# Patient Record
Sex: FEMALE | Race: OTHER | Employment: PART TIME | URBAN - METROPOLITAN AREA
[De-identification: names, ages, dates, MRNs, and addresses within clinical notes are randomized per-mention and may not be internally consistent; named-entity substitution may affect disease eponyms.]

---

## 2018-11-14 ENCOUNTER — HOSPITAL ENCOUNTER (OUTPATIENT)
Age: 23
Setting detail: SPECIMEN
Discharge: HOME | End: 2018-11-14
Payer: MEDICARE

## 2018-11-14 ENCOUNTER — OFFICE VISIT (OUTPATIENT)
Dept: INTERNAL MEDICINE | Age: 23
End: 2018-11-14
Payer: MEDICARE

## 2018-11-14 VITALS
BODY MASS INDEX: 22.88 KG/M2 | HEART RATE: 52 BPM | OXYGEN SATURATION: 98 % | WEIGHT: 134 LBS | HEIGHT: 64 IN | DIASTOLIC BLOOD PRESSURE: 72 MMHG | SYSTOLIC BLOOD PRESSURE: 126 MMHG

## 2018-11-14 DIAGNOSIS — Z23 NEEDS FLU SHOT: ICD-10-CM

## 2018-11-14 DIAGNOSIS — R19.8 ALTERNATING CONSTIPATION AND DIARRHEA: ICD-10-CM

## 2018-11-14 DIAGNOSIS — R10.13 EPIGASTRIC PAIN: ICD-10-CM

## 2018-11-14 DIAGNOSIS — R10.13 EPIGASTRIC PAIN: Primary | ICD-10-CM

## 2018-11-14 DIAGNOSIS — Z23 NEED FOR TDAP VACCINATION: ICD-10-CM

## 2018-11-14 LAB
ABSOLUTE EOS #: 0.12 K/UL (ref 0–0.44)
ABSOLUTE IMMATURE GRANULOCYTE: 0.03 K/UL (ref 0–0.3)
ABSOLUTE LYMPH #: 1.32 K/UL (ref 1.1–3.7)
ABSOLUTE MONO #: 0.53 K/UL (ref 0.1–1.2)
BASOPHILS # BLD: 1 % (ref 0–2)
BASOPHILS ABSOLUTE: 0.04 K/UL (ref 0–0.2)
C-REACTIVE PROTEIN: 8.7 MG/L (ref 0–5)
DIFFERENTIAL TYPE: ABNORMAL
EOSINOPHILS RELATIVE PERCENT: 2 % (ref 1–4)
HCT VFR BLD CALC: 44.1 % (ref 36.3–47.1)
HEMOGLOBIN: 14.3 G/DL (ref 11.9–15.1)
IMMATURE GRANULOCYTES: 0 %
LYMPHOCYTES # BLD: 17 % (ref 24–43)
MCH RBC QN AUTO: 30.6 PG (ref 25.2–33.5)
MCHC RBC AUTO-ENTMCNC: 32.4 G/DL (ref 28.4–34.8)
MCV RBC AUTO: 94.4 FL (ref 82.6–102.9)
MONOCYTES # BLD: 7 % (ref 3–12)
NRBC AUTOMATED: 0 PER 100 WBC
PDW BLD-RTO: 12.2 % (ref 11.8–14.4)
PLATELET # BLD: 281 K/UL (ref 138–453)
PLATELET ESTIMATE: ABNORMAL
PMV BLD AUTO: 11.6 FL (ref 8.1–13.5)
RBC # BLD: 4.67 M/UL (ref 3.95–5.11)
RBC # BLD: ABNORMAL 10*6/UL
SEDIMENTATION RATE, ERYTHROCYTE: 5 MM (ref 0–20)
SEG NEUTROPHILS: 73 % (ref 36–65)
SEGMENTED NEUTROPHILS ABSOLUTE COUNT: 5.72 K/UL (ref 1.5–8.1)
WBC # BLD: 7.8 K/UL (ref 3.5–11.3)
WBC # BLD: ABNORMAL 10*3/UL

## 2018-11-14 PROCEDURE — 85025 COMPLETE CBC W/AUTO DIFF WBC: CPT

## 2018-11-14 PROCEDURE — G8420 CALC BMI NORM PARAMETERS: HCPCS | Performed by: INTERNAL MEDICINE

## 2018-11-14 PROCEDURE — G8427 DOCREV CUR MEDS BY ELIG CLIN: HCPCS | Performed by: INTERNAL MEDICINE

## 2018-11-14 PROCEDURE — 86140 C-REACTIVE PROTEIN: CPT

## 2018-11-14 PROCEDURE — 85651 RBC SED RATE NONAUTOMATED: CPT

## 2018-11-14 PROCEDURE — 99211 OFF/OP EST MAY X REQ PHY/QHP: CPT | Performed by: INTERNAL MEDICINE

## 2018-11-14 PROCEDURE — 90715 TDAP VACCINE 7 YRS/> IM: CPT | Performed by: INTERNAL MEDICINE

## 2018-11-14 PROCEDURE — 36415 COLL VENOUS BLD VENIPUNCTURE: CPT

## 2018-11-14 PROCEDURE — 90686 IIV4 VACC NO PRSV 0.5 ML IM: CPT | Performed by: INTERNAL MEDICINE

## 2018-11-14 PROCEDURE — 99203 OFFICE O/P NEW LOW 30 MIN: CPT | Performed by: INTERNAL MEDICINE

## 2018-11-14 PROCEDURE — G8482 FLU IMMUNIZE ORDER/ADMIN: HCPCS | Performed by: INTERNAL MEDICINE

## 2018-11-14 PROCEDURE — 1036F TOBACCO NON-USER: CPT | Performed by: INTERNAL MEDICINE

## 2018-11-14 RX ORDER — DESOGESTREL AND ETHINYL ESTRADIOL 0.15-0.03
1 KIT ORAL DAILY
COMMUNITY
Start: 2018-10-18

## 2018-11-14 ASSESSMENT — ENCOUNTER SYMPTOMS
SHORTNESS OF BREATH: 0
NAUSEA: 0
VOMITING: 1
ABDOMINAL PAIN: 1
BLOOD IN STOOL: 0
WHEEZING: 0
CONSTIPATION: 1
DIARRHEA: 1
VOICE CHANGE: 0
COUGH: 1
RHINORRHEA: 0

## 2018-11-14 ASSESSMENT — PATIENT HEALTH QUESTIONNAIRE - PHQ9
SUM OF ALL RESPONSES TO PHQ QUESTIONS 1-9: 0
2. FEELING DOWN, DEPRESSED OR HOPELESS: 0
SUM OF ALL RESPONSES TO PHQ QUESTIONS 1-9: 0
1. LITTLE INTEREST OR PLEASURE IN DOING THINGS: 0
SUM OF ALL RESPONSES TO PHQ9 QUESTIONS 1 & 2: 0

## 2018-11-14 NOTE — PATIENT INSTRUCTIONS
You have been given a lab order no need to schedule      Avs was given and reviewed appt card given with next appt. It is very important that you keep this appointment, if you need to cancel please call [phone] with any questions.  ARIA

## 2018-11-17 ASSESSMENT — ENCOUNTER SYMPTOMS
BACK PAIN: 0
PHOTOPHOBIA: 0
EYE PAIN: 0

## 2018-12-03 ENCOUNTER — HOSPITAL ENCOUNTER (OUTPATIENT)
Age: 23
Setting detail: SPECIMEN
Discharge: HOME | End: 2018-12-03
Payer: MEDICARE

## 2018-12-03 DIAGNOSIS — R10.13 EPIGASTRIC PAIN: ICD-10-CM

## 2018-12-03 DIAGNOSIS — R19.8 ALTERNATING CONSTIPATION AND DIARRHEA: ICD-10-CM

## 2018-12-03 LAB
DATE, STOOL #1: NORMAL
DATE, STOOL #2: NORMAL
DATE, STOOL #3: NORMAL
HEMOCCULT SP1 STL QL: NEGATIVE
HEMOCCULT SP2 STL QL: NORMAL
HEMOCCULT SP3 STL QL: NORMAL
LACTOFERRIN, QUAL: NORMAL
TIME, STOOL #1: NORMAL
TIME, STOOL #2: NORMAL
TIME, STOOL #3: NORMAL

## 2018-12-03 PROCEDURE — 83630 LACTOFERRIN FECAL (QUAL): CPT

## 2018-12-03 PROCEDURE — 83993 ASSAY FOR CALPROTECTIN FECAL: CPT

## 2018-12-03 PROCEDURE — G0328 FECAL BLOOD SCRN IMMUNOASSAY: HCPCS

## 2018-12-07 LAB — CALPROTECTIN, FECAL: <16 UG/G

## 2019-06-24 NOTE — PROGRESS NOTES
Called patient L/M to call [facility] at [phone] to get her MRI scheduled for years. She was seen a pediatrician in [place]. Not sure if she's had a workup before. she had labs done recently including H. pylori test, CBC and LFTs and all of them have been normal.     today she is also complaining of some cough and congestion. No fever or chills.   No history of asthma.      05/15/2018 H Pylori Ag, Stool 4&STOOL Normal   H Pylori Ag negative negative Final [facility] Main Hosp: [address] [place]   05/04/2018 CBC W/ Auto Diff 1&BLOOD Normal   Wbc 6.89 10*3/uL 4.00-10.60 10*3/uL Final [facility] Main Hosp: [address] [place], [place]       1&BLOOD Normal   Rbc 4.43 10*6/uL 3.80-5.00 10*6/uL Final [facility] Main Hosp: [address], [place]       1&BLOOD Normal   Hgb 13.4 g/dL 12.0-15.0 g/dL Final [facility] Main Hosp: [address], [place]       1&BLOOD Normal   Hct 40.6 % 36.0-45.0 % Final [facility] Main Hosp: [address], [place]       1&BLOOD Normal   Mcv 91.6 fL 82.0-98.0 fL Final [facility] Main Hosp: [address], [place]       1&BLOOD Normal   Mch 30.2 pg 27.0-33.0 pg Final [facility] Main Hosp: [address], [place]       1&BLOOD Normal   Mchc 33.0 g/dL 32.0-35.0 g/dL Final [facility] Main Hosp: [address], [place]       1&BLOOD Normal   Rdw 12.0 % 11.5-15.0 % Final [facility] Main Hosp: [address], [place]       1&BLOOD Normal   Plat Cnt 307 10*3/uL 150-400 10*3/uL Final [facility] Main Hosp: [address], [place]       1&BLOOD Normal   Neutrophils 67.6 % 40.0-72.0 % Final [facility] Main Hosp: [address], [place]       1&BLOOD Normal   Immature Grans 0.1 % 0.0-1.0 % Final [facility] Main Hosp: [address], [place]       1&BLOOD Normal   Lymphs 20.9 Hosp: [address] [facility]       1&BLOOD Normal   HDL Cholesterol 60 mg/dL 23-92 mg/dL Final [facility] Main Hosp: [address] [facility]       1&BLOOD Normal   VLDL Chol 26 mg/dL 0-40 mg/dL Final [facility] Main Hosp: [address] [facility]       1&BLOOD Normal   Chol/hdl 3.1 .0-4.5 Final [facility] Main Hosp: [address] [facility]       1&BLOOD Normal   LDL Cholesterol 102 mg/dL 0-130 mg/dL Final [facility] Main Hosp: [address] [facility]       1&BLOOD     non-HDL Cholesterol 128 mg/dL   Final [facility] Main Hosp: [address] [facility]   05/04/2018 T4, Free, Serum 1&BLOOD Normal   Free T4 1.04 NG/dL 0.71-1.85 NG/dL Final [facility] Main Hosp: [address] [facility]   05/04/2018 TSH, Serum or Plasma 1&BLOOD Normal   Tsh 1.74 micro-IU/mL 0.34-5.60 micro-IU/mL Final [facility] Main Hosp: [address] [facility]   05/04/2018 HbA1C (Hemoglobin a1C), Blood 1&BLOOD Normal   Hb a1C 5.3 % 4.0-6.0 % Final [facility] Main Hosp: [address] [facility]       1&BLOOD Normal   Est Avg Glucose 105 mg/dL  mg/dL Final [facility] Main Hosp: [address] [facility]   05/04/2018 Vitamin D, 25-Hydroxy, Total, Serum 1&BLOOD Normal   Vitamin D 25-Oh 44.9 NG/mL 30.0-80.0 NG/mL Final [address]: [address] [place] [phone], [facility]     Past Encounters        PAST MEDICALAND SURGICAL HISTORY:    History reviewed. No pertinent past medical history. History reviewed. No pertinent surgical history. SOCIAL HISTORY:    TOBACCO:   reports that she has never smoked. She has never used smokeless tobacco.  ETOH:   reports that she does not drink alcohol. DRUGS:  reports that she does not use drugs.   OCCUPATION:      ALLERGIES:    No Known Allergies      HOME Mouth/Throat: Oropharynx is clear and moist.   Eyes: Pupils are equal, round, and reactive to light. Conjunctivae and EOM are normal.   Neck: Normal range of motion. Neck supple. No thyromegaly present. Cardiovascular: Normal rate and regular rhythm. No murmur heard. Pulmonary/Chest: Effort normal and breath sounds normal. She has no wheezes. She has no rales. Abdominal: Soft. Bowel sounds are normal. She exhibits no distension. There is no tenderness. There is no guarding. Musculoskeletal: Normal range of motion. She exhibits no edema. Lymphadenopathy:     She has no cervical adenopathy. Neurological: She is alert and oriented to person, place, and time. Skin: Skin is warm and dry. She is not diaphoretic. Psychiatric: She has a normal mood and affect. Nursing note and vitals reviewed. LABORATORY FINDINGS:    CBC: No results found for: WBC, HGB, PLT  BMP:  No results found for: NA, K, CL, CO2, BUN, CREATININE, GLUCOSE  Hemoglobin A1C: No results found for: LABA1C  Lipid profile: No results found for: CHOL, TRIG, HDL  No results found for: LDLCALC, LDLCHOLESTEROL, LDLDIRECT    Thyroid functions: No results found for: TSH   Hepatic functions: No results found for: ALT, AST, PROT, BILITOT, BILIDIR, LABALBU  ASSESSMENT AND PLAN:   1. Epigastric pain  ppi  Avoid marijuana    - Occult Blood Screen; Future  - Sedimentation rate, automated; Future  - C-Reactive Protein; Future  - CBC With Auto Differential; Future    2. Alternating constipation and diarrhea  Fiber    - Calprotectin Stool; Future  - Fecal lactoferrin; Future  - Stool for WBC #1; Future  - Occult Blood Screen; Future  - Sedimentation rate, automated; Future  - C-Reactive Protein; Future  - CBC With Auto Differential; Future    3. Needs flu shot    - INFLUENZA, QUADV, 3 YRS AND OLDER, IM, PF, PREFILL SYR OR SDV, 0.5ML (FLUZONE QUADV, PF)  - CT ADMIN INFLUENZA VIRUS VAC    4.  Need for Tdap vaccination    - Tdap (age 6y and older)